# Patient Record
Sex: FEMALE | Race: BLACK OR AFRICAN AMERICAN | NOT HISPANIC OR LATINO | Employment: FULL TIME | URBAN - METROPOLITAN AREA
[De-identification: names, ages, dates, MRNs, and addresses within clinical notes are randomized per-mention and may not be internally consistent; named-entity substitution may affect disease eponyms.]

---

## 2019-01-14 ENCOUNTER — TELEPHONE (OUTPATIENT)
Dept: OBSTETRICS AND GYNECOLOGY | Facility: CLINIC | Age: 54
End: 2019-01-14

## 2019-01-14 NOTE — TELEPHONE ENCOUNTER
----- Message from Marichuy Robbins sent at 1/14/2019 12:55 PM CST -----  Pt at [phone]//states she would like to talk with your office//she thinks she saw Dr Suggs in 2017//does not remember exactly where she saw her in [place]//maybe before  come to [facility]//please call to discuss//thanks/[facility]

## 2019-01-14 NOTE — TELEPHONE ENCOUNTER
Pt has not been seen here but a former Pt of Dr Suggs, she states she has questions. Left message for Pt to call back. DS

## 2019-02-06 ENCOUNTER — OFFICE VISIT (OUTPATIENT)
Dept: OBSTETRICS AND GYNECOLOGY | Facility: CLINIC | Age: 54
End: 2019-02-06
Payer: COMMERCIAL

## 2019-02-06 VITALS
DIASTOLIC BLOOD PRESSURE: 84 MMHG | HEIGHT: 66 IN | SYSTOLIC BLOOD PRESSURE: 126 MMHG | WEIGHT: 205 LBS | BODY MASS INDEX: 32.95 KG/M2

## 2019-02-06 DIAGNOSIS — Z01.419 ENCOUNTER FOR GYNECOLOGICAL EXAMINATION (GENERAL) (ROUTINE) WITHOUT ABNORMAL FINDINGS: Primary | ICD-10-CM

## 2019-02-06 DIAGNOSIS — Z12.4 SCREENING FOR CERVICAL CANCER: ICD-10-CM

## 2019-02-06 DIAGNOSIS — Z12.31 SCREENING MAMMOGRAM, ENCOUNTER FOR: ICD-10-CM

## 2019-02-06 PROCEDURE — 99999 PR PBB SHADOW E&M-EST. PATIENT-LVL III: CPT | Mod: PBBFAC,,, | Performed by: OBSTETRICS & GYNECOLOGY

## 2019-02-06 PROCEDURE — 88141 CYTOPATH C/V INTERPRET: CPT | Mod: ,,, | Performed by: PATHOLOGY

## 2019-02-06 PROCEDURE — 88141 LIQUID-BASED PAP SMEAR, SCREENING: ICD-10-PCS | Mod: ,,, | Performed by: PATHOLOGY

## 2019-02-06 PROCEDURE — 87624 HPV HI-RISK TYP POOLED RSLT: CPT

## 2019-02-06 PROCEDURE — 99386 PREV VISIT NEW AGE 40-64: CPT | Mod: S$GLB,,, | Performed by: OBSTETRICS & GYNECOLOGY

## 2019-02-06 PROCEDURE — 99386 PR PREVENTIVE VISIT,NEW,40-64: ICD-10-PCS | Mod: S$GLB,,, | Performed by: OBSTETRICS & GYNECOLOGY

## 2019-02-06 PROCEDURE — 99999 PR PBB SHADOW E&M-EST. PATIENT-LVL III: ICD-10-PCS | Mod: PBBFAC,,, | Performed by: OBSTETRICS & GYNECOLOGY

## 2019-02-06 PROCEDURE — 88175 CYTOPATH C/V AUTO FLUID REDO: CPT | Performed by: PATHOLOGY

## 2019-02-06 RX ORDER — MULTIVITAMIN
1 TABLET ORAL DAILY
COMMUNITY

## 2019-02-06 RX ORDER — LACTULOSE 10 G/15ML
SOLUTION ORAL; RECTAL
Refills: 0 | COMMUNITY
Start: 2018-11-13 | End: 2019-02-06

## 2019-02-06 NOTE — PROGRESS NOTES
Subjective:       Patient ID: Alessia Mak is a 53 y.o. female.    Chief Complaint:  Well Woman      History of Present Illness  HPI  Annual Exam-Postmenopausal  Patient presents for annual exam. The patient has no complaints today. The patient is not currently sexually active--denies vaginal dryness. GYN screening history: last pap: was normal and patient does not recall when last pap was and last mammogram: approximate date  and was normal. The patient has never been taking hormone replacement therapy. Patient denies post-menopausal vaginal bleeding. The patient wears seatbelts: yes. The patient participates in regular exercise: yes.--walking 3 x/wk; 2miles/40 min;  Has the patient ever been transfused or tattooed?: no. The patient reports that there is not domestic violence in her life.      worsening hot flushes despite black cohosh--feels symptoms are getting better; mood ok;     Denies urinary leakage;         GYN & OB History  Patient's last menstrual period was 2016 (approximate).   Date of Last Pap: No result found    OB History    Para Term  AB Living   1 1 1         SAB TAB Ectopic Multiple Live Births                  # Outcome Date GA Lbr Jesus/2nd Weight Sex Delivery Anes PTL Lv   1 Term      CS-LTranv         Complications: Stillbirth        Has adopted daughter--8 yrs old  Review of Systems  Review of Systems   Endocrine: Positive for hot flashes.           Objective:      Physical Exam:   Constitutional: She appears well-developed.     Eyes: Conjunctivae and EOM are normal. Pupils are equal, round, and reactive to light.    Neck: Normal range of motion. Neck supple.     Pulmonary/Chest: Effort normal. Right breast exhibits no mass, no nipple discharge, no skin change and no tenderness. Left breast exhibits no mass, no nipple discharge, no skin change and no tenderness. Breasts are symmetrical.        Abdominal: Soft.     Genitourinary: Rectum normal, vagina normal and uterus  normal. Pelvic exam was performed with patient supine. Cervix is normal. Right adnexum displays no tenderness. Left adnexum displays no mass and no tenderness. No erythema, bleeding, rectocele, cystocele or unspecified prolapse of vaginal walls in the vagina. No vaginal discharge found. Labial bartholins normal.Additional cervical findings: pap smear done  Genitourinary Comments: atrophic        Uterus Size: 6 cm   Musculoskeletal: Normal range of motion.       Neurological: She is alert.    Skin: Skin is warm.    Psychiatric: She has a normal mood and affect.           Assessment:        1. Encounter for gynecological examination (general) (routine) without abnormal findings    2. Screening mammogram, encounter for    3. Screening for cervical cancer               Plan:      Continue annual well woman exam.  mammo ordered, continue yearly until age 75  Pap today; Reviewed updated recommendations for pap smears (every 3 years) in low risk patients.   Recommend annual pelvic exams.  Reviewed recommendations for annual CBE.  Continue diet, exercise, weight loss  Osteoporosis prevention; 1200mg calcium/d with source of vitamin d

## 2019-02-11 LAB
HPV HR 12 DNA CVX QL NAA+PROBE: NEGATIVE
HPV16 AG SPEC QL: NEGATIVE
HPV18 DNA SPEC QL NAA+PROBE: NEGATIVE

## 2019-02-12 ENCOUNTER — TELEPHONE (OUTPATIENT)
Dept: OBSTETRICS AND GYNECOLOGY | Facility: CLINIC | Age: 54
End: 2019-02-12

## 2019-02-12 NOTE — TELEPHONE ENCOUNTER
Spoke to the pt. And advised her that her pap is mildly abnormal( ascus); but did not show the high risk hpv virus; I do not have to do anything to your cervix at this point, please make sure she  Keeps her yearly exam in order to repeat your pap. Pt. Acknowledged understanding. nayana Bernabe    Please advise the  pap was mildly abnormal( ascus); but did not show the high risk hpv virus; I do not have to do anything to your cervix at this point, please make sure she  Keeps her yearly exam in order to repeat your pap

## 2019-02-15 ENCOUNTER — HOSPITAL ENCOUNTER (OUTPATIENT)
Dept: RADIOLOGY | Facility: HOSPITAL | Age: 54
Discharge: HOME | End: 2019-02-15
Attending: OBSTETRICS & GYNECOLOGY
Payer: COMMERCIAL

## 2019-02-15 VITALS — HEIGHT: 66 IN | WEIGHT: 205 LBS | BODY MASS INDEX: 32.95 KG/M2

## 2019-02-15 DIAGNOSIS — Z12.31 SCREENING MAMMOGRAM, ENCOUNTER FOR: ICD-10-CM

## 2019-02-15 PROCEDURE — 77063 MAMMO DIGITAL SCREENING BILAT WITH TOMOSYNTHESIS_CAD: ICD-10-PCS | Mod: 26,,, | Performed by: RADIOLOGY

## 2019-02-15 PROCEDURE — 77067 SCR MAMMO BI INCL CAD: CPT | Mod: 26,,, | Performed by: RADIOLOGY

## 2019-02-15 PROCEDURE — 77063 BREAST TOMOSYNTHESIS BI: CPT | Mod: 26,,, | Performed by: RADIOLOGY

## 2019-02-15 PROCEDURE — 77067 MAMMO DIGITAL SCREENING BILAT WITH TOMOSYNTHESIS_CAD: ICD-10-PCS | Mod: 26,,, | Performed by: RADIOLOGY

## 2019-02-15 PROCEDURE — 77067 SCR MAMMO BI INCL CAD: CPT | Mod: TC

## 2019-02-18 ENCOUNTER — TELEPHONE (OUTPATIENT)
Dept: OBSTETRICS AND GYNECOLOGY | Facility: CLINIC | Age: 54
End: 2019-02-18

## 2020-12-18 ENCOUNTER — TELEPHONE (OUTPATIENT)
Dept: OBSTETRICS AND GYNECOLOGY | Facility: CLINIC | Age: 55
End: 2020-12-18

## 2020-12-18 NOTE — TELEPHONE ENCOUNTER
----- Message from Sue Lake sent at 12/18/2020  9:55 AM CST -----  Regarding: annual  Type:  Sooner Apoointment Request    Caller is requesting a sooner appointment.  Caller declined first available appointment listed below.  Caller will not accept being placed on the waitlist and is requesting a message be sent to doctor.  Name of Caller:Pt  When is the first available appointment?n/a  Symptoms:annual  Would the patient rather a call back or a response via [facility]? Call back  Best Call Back Number:[phone]  Additional Information: Please call back. Thanks

## 2020-12-24 ENCOUNTER — OFFICE VISIT (OUTPATIENT)
Dept: OBSTETRICS AND GYNECOLOGY | Facility: CLINIC | Age: 55
End: 2020-12-24
Payer: COMMERCIAL

## 2020-12-24 VITALS
SYSTOLIC BLOOD PRESSURE: 126 MMHG | HEIGHT: 66 IN | WEIGHT: 214.75 LBS | DIASTOLIC BLOOD PRESSURE: 70 MMHG | BODY MASS INDEX: 34.51 KG/M2

## 2020-12-24 DIAGNOSIS — Z12.31 SCREENING MAMMOGRAM, ENCOUNTER FOR: ICD-10-CM

## 2020-12-24 DIAGNOSIS — R87.610 ASCUS OF CERVIX WITH NEGATIVE HIGH RISK HPV: ICD-10-CM

## 2020-12-24 DIAGNOSIS — Z01.419 ENCOUNTER FOR GYNECOLOGICAL EXAMINATION (GENERAL) (ROUTINE) WITHOUT ABNORMAL FINDINGS: Primary | ICD-10-CM

## 2020-12-24 PROCEDURE — 99999 PR PBB SHADOW E&M-EST. PATIENT-LVL III: ICD-10-PCS | Mod: PBBFAC,,, | Performed by: OBSTETRICS & GYNECOLOGY

## 2020-12-24 PROCEDURE — 88142 CYTOPATH C/V THIN LAYER: CPT

## 2020-12-24 PROCEDURE — 99999 PR PBB SHADOW E&M-EST. PATIENT-LVL III: CPT | Mod: PBBFAC,,, | Performed by: OBSTETRICS & GYNECOLOGY

## 2020-12-24 PROCEDURE — 99396 PR PREVENTIVE VISIT,EST,40-64: ICD-10-PCS | Mod: S$GLB,,, | Performed by: OBSTETRICS & GYNECOLOGY

## 2020-12-24 PROCEDURE — 87624 HPV HI-RISK TYP POOLED RSLT: CPT

## 2020-12-24 PROCEDURE — 3008F BODY MASS INDEX DOCD: CPT | Mod: CPTII,S$GLB,, | Performed by: OBSTETRICS & GYNECOLOGY

## 2020-12-24 PROCEDURE — 1126F PR PAIN SEVERITY QUANTIFIED, NO PAIN PRESENT: ICD-10-PCS | Mod: S$GLB,,, | Performed by: OBSTETRICS & GYNECOLOGY

## 2020-12-24 PROCEDURE — 99396 PREV VISIT EST AGE 40-64: CPT | Mod: S$GLB,,, | Performed by: OBSTETRICS & GYNECOLOGY

## 2020-12-24 PROCEDURE — 3008F PR BODY MASS INDEX (BMI) DOCUMENTED: ICD-10-PCS | Mod: CPTII,S$GLB,, | Performed by: OBSTETRICS & GYNECOLOGY

## 2020-12-24 PROCEDURE — 1126F AMNT PAIN NOTED NONE PRSNT: CPT | Mod: S$GLB,,, | Performed by: OBSTETRICS & GYNECOLOGY

## 2020-12-24 NOTE — PROGRESS NOTES
Subjective:       Patient ID: Alessia Mak is a 55 y.o. female.    Chief Complaint:  Annual Exam      History of Present Illness  HPI  Annual Exam-Postmenopausal  Patient presents for annual exam. The patient has no complaints today. The patient is not currently sexually active. GYN screening history: last pap: approximate date ascus and was abnormal: ascus; neg hpv;  and last mammogram: approximate date 2019  and was normal. The patient has never been taking hormone replacement therapy. Patient denies post-menopausal vaginal bleeding. The patient wears seatbelts: yes. The patient participates in regular exercise: yes. --walks 2 miles 5x/wk; Has the patient ever been transfused or tattooed?: no. The patient reports that there is not domestic violence in her life.      No problems sleeping  Black cohosh helps with hot flushes    Denies urinary leakage;      Adopted daughter is now 10  GYN & OB History  Patient's last menstrual period was 2016 (approximate).   Date of Last Pap: No result found    OB History    Para Term  AB Living   1 1 1         SAB TAB Ectopic Multiple Live Births                  # Outcome Date GA Lbr Jesus/2nd Weight Sex Delivery Anes PTL Lv   1 Term      CS-LTranv   FD      Complications: Stillbirth       Review of Systems  Review of Systems   Genitourinary: Positive for hot flashes.   All other systems reviewed and are negative.          Objective:      Physical Exam:   Constitutional: She is oriented to person, place, and time. She appears well-developed.     Eyes: Pupils are equal, round, and reactive to light. Conjunctivae and EOM are normal.    Neck: Normal range of motion. Neck supple.     Pulmonary/Chest: Effort normal. Right breast exhibits no mass, no nipple discharge, no skin change and no tenderness. Left breast exhibits no mass, no nipple discharge, no skin change and no tenderness. Breasts are symmetrical.        Abdominal: Soft.     Genitourinary:    Vagina,  uterus, right adnexa, left adnexa and rectum normal.      Pelvic exam was performed with patient supine.   Cervix is normal. There is a normal right adnexa and a normal left adnexa. Right adnexum displays no mass and no tenderness. Left adnexum displays no mass and no tenderness. No erythema, bleeding, rectocele, cystocele or unspecified prolapse of vaginal walls in the vagina. Labial bartholins normal.Additional cervical findings: pap smear donenegative for vaginal discharge       Uterus Size: 6 cm   Musculoskeletal: Normal range of motion.       Neurological: She is alert and oriented to person, place, and time.    Skin: Skin is warm.    Psychiatric: She has a normal mood and affect.           Assessment:         Encounter Diagnoses   Name Primary?    Screening mammogram, encounter for     ASCUS of cervix with negative high risk HPV     Encounter for gynecological examination (general) (routine) without abnormal findings Yes                Plan:      Continue annual well woman exam.  Pap today; Reviewed updated recommendations for pap smears (every 3 years) in low risk patients.   Recommend annual pelvic exams.  Reviewed recommendations for annual CBE.  mammo ordered, continue yearly until age 75  Continue diet, exercise, weight loss  Osteoporosis prevention; 1200mg calcium/d with source of vitamin d   colonoscpy due in 2-3 yrs

## 2021-01-04 LAB
HPV HR 12 DNA SPEC QL NAA+PROBE: NEGATIVE
HPV16 AG SPEC QL: NEGATIVE
HPV18 DNA SPEC QL NAA+PROBE: NEGATIVE

## 2021-01-30 LAB
FINAL PATHOLOGIC DIAGNOSIS: NORMAL
Lab: NORMAL

## 2021-04-29 ENCOUNTER — PATIENT MESSAGE (OUTPATIENT)
Dept: RESEARCH | Facility: HOSPITAL | Age: 56
End: 2021-04-29

## 2022-03-15 ENCOUNTER — OFFICE VISIT (OUTPATIENT)
Dept: OPHTHALMOLOGY | Facility: CLINIC | Age: 57
End: 2022-03-15
Payer: OTHER GOVERNMENT

## 2022-03-15 ENCOUNTER — OFFICE VISIT (OUTPATIENT)
Dept: OTOLARYNGOLOGY | Facility: CLINIC | Age: 57
End: 2022-03-15
Payer: OTHER GOVERNMENT

## 2022-03-15 ENCOUNTER — CLINICAL SUPPORT (OUTPATIENT)
Dept: AUDIOLOGY | Facility: CLINIC | Age: 57
End: 2022-03-15
Payer: OTHER GOVERNMENT

## 2022-03-15 VITALS
WEIGHT: 222.44 LBS | SYSTOLIC BLOOD PRESSURE: 122 MMHG | DIASTOLIC BLOOD PRESSURE: 78 MMHG | HEART RATE: 74 BPM | TEMPERATURE: 98 F | BODY MASS INDEX: 35.9 KG/M2

## 2022-03-15 DIAGNOSIS — H93.13 TINNITUS OF BOTH EARS: Primary | ICD-10-CM

## 2022-03-15 DIAGNOSIS — H93.13 TINNITUS, BILATERAL: Primary | ICD-10-CM

## 2022-03-15 DIAGNOSIS — R51.9 CHRONIC NONINTRACTABLE HEADACHE, UNSPECIFIED HEADACHE TYPE: Primary | ICD-10-CM

## 2022-03-15 DIAGNOSIS — H83.3X9: ICD-10-CM

## 2022-03-15 DIAGNOSIS — H25.11 NUCLEAR SCLEROSIS OF RIGHT EYE: ICD-10-CM

## 2022-03-15 DIAGNOSIS — H25.12 NUCLEAR SCLEROSIS OF LEFT EYE: ICD-10-CM

## 2022-03-15 DIAGNOSIS — G89.29 CHRONIC NONINTRACTABLE HEADACHE, UNSPECIFIED HEADACHE TYPE: Primary | ICD-10-CM

## 2022-03-15 DIAGNOSIS — H52.7 REFRACTIVE DISORDER: ICD-10-CM

## 2022-03-15 PROCEDURE — 92567 TYMPANOMETRY: CPT | Mod: PBBFAC | Performed by: AUDIOLOGIST-HEARING AID FITTER

## 2022-03-15 PROCEDURE — 99999 PR PBB SHADOW E&M-EST. PATIENT-LVL III: CPT | Mod: PBBFAC,,, | Performed by: PHYSICIAN ASSISTANT

## 2022-03-15 PROCEDURE — 99213 OFFICE O/P EST LOW 20 MIN: CPT | Mod: PBBFAC,27 | Performed by: PHYSICIAN ASSISTANT

## 2022-03-15 PROCEDURE — 99999 PR PBB SHADOW E&M-EST. PATIENT-LVL III: ICD-10-PCS | Mod: PBBFAC,,, | Performed by: PHYSICIAN ASSISTANT

## 2022-03-15 PROCEDURE — 99203 OFFICE O/P NEW LOW 30 MIN: CPT | Mod: S$PBB,,, | Performed by: STUDENT IN AN ORGANIZED HEALTH CARE EDUCATION/TRAINING PROGRAM

## 2022-03-15 PROCEDURE — 99203 PR OFFICE/OUTPT VISIT, NEW, LEVL III, 30-44 MIN: ICD-10-PCS | Mod: S$PBB,,, | Performed by: STUDENT IN AN ORGANIZED HEALTH CARE EDUCATION/TRAINING PROGRAM

## 2022-03-15 PROCEDURE — 99999 PR PBB SHADOW E&M-EST. PATIENT-LVL II: CPT | Mod: PBBFAC,,, | Performed by: STUDENT IN AN ORGANIZED HEALTH CARE EDUCATION/TRAINING PROGRAM

## 2022-03-15 PROCEDURE — 99203 OFFICE O/P NEW LOW 30 MIN: CPT | Mod: S$PBB,,, | Performed by: PHYSICIAN ASSISTANT

## 2022-03-15 PROCEDURE — 92552 PURE TONE AUDIOMETRY AIR: CPT | Mod: PBBFAC | Performed by: AUDIOLOGIST-HEARING AID FITTER

## 2022-03-15 PROCEDURE — 99212 OFFICE O/P EST SF 10 MIN: CPT | Mod: PBBFAC | Performed by: STUDENT IN AN ORGANIZED HEALTH CARE EDUCATION/TRAINING PROGRAM

## 2022-03-15 PROCEDURE — 99999 PR PBB SHADOW E&M-EST. PATIENT-LVL I: ICD-10-PCS | Mod: PBBFAC,,, | Performed by: AUDIOLOGIST-HEARING AID FITTER

## 2022-03-15 PROCEDURE — 99999 PR PBB SHADOW E&M-EST. PATIENT-LVL II: ICD-10-PCS | Mod: PBBFAC,,, | Performed by: STUDENT IN AN ORGANIZED HEALTH CARE EDUCATION/TRAINING PROGRAM

## 2022-03-15 PROCEDURE — 99999 PR PBB SHADOW E&M-EST. PATIENT-LVL I: CPT | Mod: PBBFAC,,, | Performed by: AUDIOLOGIST-HEARING AID FITTER

## 2022-03-15 PROCEDURE — 99211 OFF/OP EST MAY X REQ PHY/QHP: CPT | Mod: PBBFAC,27 | Performed by: AUDIOLOGIST-HEARING AID FITTER

## 2022-03-15 PROCEDURE — 99203 PR OFFICE/OUTPT VISIT, NEW, LEVL III, 30-44 MIN: ICD-10-PCS | Mod: S$PBB,,, | Performed by: PHYSICIAN ASSISTANT

## 2022-03-15 RX ORDER — ATORVASTATIN CALCIUM 40 MG/1
40 TABLET, FILM COATED ORAL DAILY
COMMUNITY
Start: 2021-11-20

## 2022-03-15 RX ORDER — ESCITALOPRAM OXALATE 5 MG/1
5 TABLET ORAL DAILY
COMMUNITY
Start: 2022-03-11

## 2022-03-15 RX ORDER — METFORMIN HYDROCHLORIDE 500 MG/1
1000 TABLET, EXTENDED RELEASE ORAL 2 TIMES DAILY
COMMUNITY
Start: 2022-03-11

## 2022-03-15 NOTE — PROGRESS NOTES
HPI     Concerns About Ocular Health      Additional comments: The patient states she has been having headaches   chronically for years. The patient saw her PCP and they wanted her to see   an eye dr first.          Last edited by Marisol Albarran on 3/15/2022  1:10 PM. (History)            Assessment /Plan     For exam results, see Encounter Report.    Chronic nonintractable headache, unspecified headache type- Followed by PCP  Ocular exam WNL    Nuclear sclerosis of right eye- - NVS, monitor    Nuclear sclerosis of left eye- see above    Dry eyes- - ATs QID and lid hygiene w/ baby shampoo    Refractive disorder- MRx dispensed, optional      Return to clinic in  1M For GOCT, GONIO, and IOP check for headaches

## 2022-03-15 NOTE — PROGRESS NOTES
Alessia Mak was seen 03/15/2022 for an audiological evaluation.  She reports tinnitus that first started many years ago.  She describes the tinnitus as ringing and is nonpulsatile.  The tinnitus is present in both ears. She was in the [employer] for 4 years and has a significant hx of noise exposure.  She has not noted any diminished hearing.  [facility] hearing that is WNL. She has not recently started any new medications and has not had any dietary changes. Not yet service connected for tinnitus.     Results reveal normal hearing in each ear from 250-8000 Hz.   Tympanograms were Type A, normal for the right ear and Type A, normal for the left ear.    We discussed that tinnitus is most often caused by a hearing loss, and that as the hair cells are damaged, either genetic or as a result of loud noise exposure, they then cause tinnitus. Some patients find that restricting the salt or caffeine in their diet helps, and there is also an OTC supplement, lipflavinoids, that some people find to be effective though their benefit is not fully proven. Tinnitus tends to be louder in times of stress and fatigue, and may decrease with time. Sound machines may also be an effective masking technique if needed at night.     Patient was counseled on the above findings.    Recommendations:  1. Tinnitus: See above.  2. Annual Audiograms.

## 2022-03-15 NOTE — PROGRESS NOTES
Subjective:   Patient ID: Alessia Mak is a 56 y.o. female.    Chief Complaint: Tinnitus (Ringing in the ears, disrupting sleep)    Patient is a very pleasant 56 y.o. female here to see me today for the first time for evaluation of tinnitus.  She reports tinnitus that first started many years ago.  She describes the tinnitus as ringing and is nonpulsatile.  The tinnitus is present in both ears.  She has not noted any diminished hearing.  She has not recently started any new medications and has not had any dietary changes.    Review of patient's allergies indicates:  No Known Allergies        Review of Systems   Constitutional: Negative for chills, fatigue, fever and unexpected weight change.   HENT: Positive for tinnitus. Negative for congestion, dental problem, ear discharge, ear pain, facial swelling, hearing loss, nosebleeds, postnasal drip, rhinorrhea, sinus pressure, sneezing, sore throat, trouble swallowing and voice change.    Eyes: Negative for redness, itching and visual disturbance.   Respiratory: Negative for cough, choking, shortness of breath and wheezing.    Cardiovascular: Negative for chest pain and palpitations.   Gastrointestinal: Negative for abdominal pain.        No reflux.   Musculoskeletal: Negative for gait problem.   Skin: Negative for rash.   Neurological: Negative for dizziness, light-headedness and headaches.         Objective:   /78   Pulse 74   Temp 97.5 °F (36.4 °C) (Temporal)   Wt 100.9 kg (222 lb 7.1 oz)   LMP 02/06/2016 (Approximate)   BMI 35.90 kg/m²     Physical Exam  Constitutional:       General: She is not in acute distress.     Appearance: She is well-developed.   HENT:      Head: Normocephalic and atraumatic.      Right Ear: Tympanic membrane, ear canal and external ear normal.      Left Ear: Tympanic membrane, ear canal and external ear normal.      Nose: Nose normal. No nasal deformity, septal deviation, mucosal edema or rhinorrhea.      Right Sinus: No  maxillary sinus tenderness or frontal sinus tenderness.      Left Sinus: No maxillary sinus tenderness or frontal sinus tenderness.      Mouth/Throat:      Mouth: Mucous membranes are not pale and not dry.      Dentition: No dental caries.      Pharynx: Uvula midline. No oropharyngeal exudate or posterior oropharyngeal erythema.   Eyes:      General: Lids are normal. No scleral icterus.     Extraocular Movements:      Right eye: Normal extraocular motion and no nystagmus.      Left eye: Normal extraocular motion and no nystagmus.      Conjunctiva/sclera: Conjunctivae normal.      Right eye: Right conjunctiva is not injected. No chemosis.     Left eye: Left conjunctiva is not injected. No chemosis.     Pupils: Pupils are equal, round, and reactive to light.   Neck:      Thyroid: No thyroid mass or thyromegaly.      Trachea: Trachea and phonation normal. No tracheal tenderness or tracheal deviation.   Pulmonary:      Effort: Pulmonary effort is normal. No respiratory distress.      Breath sounds: No stridor.   Abdominal:      General: There is no distension.   Lymphadenopathy:      Head:      Right side of head: No submental, submandibular, preauricular, posterior auricular or occipital adenopathy.      Left side of head: No submental, submandibular, preauricular, posterior auricular or occipital adenopathy.      Cervical: No cervical adenopathy.   Skin:     General: Skin is warm and dry.      Findings: No erythema or rash.   Neurological:      Mental Status: She is alert and oriented to person, place, and time.      Cranial Nerves: No cranial nerve deficit.   Psychiatric:         Behavior: Behavior normal.          Assessment:     1. Tinnitus of both ears        Plan:     Tinnitus of both ears    Audiogram scheduled     We also discussed that tinnitus is most often caused by a hearing loss, and that as the hair cells are damaged, either genetic or as a result of loud noise exposure, they then cause tinnitus.  Some  patients find that restricting the salt or caffeine in their diet helps, and there is also an OTC supplement, lipflavinoids, that some people find to be effective though their benefit is not fully proven.  Tinnitus tends to be louder in times of stress and fatigue, and may decrease with time.  Sound machines may also be an effective masking technique if needed at night.

## 2022-03-24 ENCOUNTER — HOSPITAL ENCOUNTER (OUTPATIENT)
Dept: RADIOLOGY | Facility: HOSPITAL | Age: 57
Discharge: HOME | End: 2022-03-24
Attending: INTERNAL MEDICINE
Payer: OTHER GOVERNMENT

## 2022-03-24 DIAGNOSIS — Z12.31 VISIT FOR SCREENING MAMMOGRAM: ICD-10-CM

## 2022-03-24 DIAGNOSIS — Z01.89 ENCOUNTER FOR OTHER SPECIFIED SPECIAL EXAMINATIONS: ICD-10-CM

## 2022-03-24 PROCEDURE — 70551 MRI BRAIN WITHOUT CONTRAST: ICD-10-PCS | Mod: 26,,, | Performed by: RADIOLOGY

## 2022-03-24 PROCEDURE — 77063 BREAST TOMOSYNTHESIS BI: CPT | Mod: TC

## 2022-03-24 PROCEDURE — 77067 MAMMO DIGITAL SCREENING BILAT WITH TOMO: ICD-10-PCS | Mod: 26,,, | Performed by: RADIOLOGY

## 2022-03-24 PROCEDURE — 77063 MAMMO DIGITAL SCREENING BILAT WITH TOMO: ICD-10-PCS | Mod: 26,,, | Performed by: RADIOLOGY

## 2022-03-24 PROCEDURE — 77063 BREAST TOMOSYNTHESIS BI: CPT | Mod: 26,,, | Performed by: RADIOLOGY

## 2022-03-24 PROCEDURE — 77067 SCR MAMMO BI INCL CAD: CPT | Mod: TC

## 2022-03-24 PROCEDURE — 70551 MRI BRAIN STEM W/O DYE: CPT | Mod: 26,,, | Performed by: RADIOLOGY

## 2022-03-24 PROCEDURE — 70551 MRI BRAIN STEM W/O DYE: CPT | Mod: TC

## 2022-03-24 PROCEDURE — 77067 SCR MAMMO BI INCL CAD: CPT | Mod: 26,,, | Performed by: RADIOLOGY

## 2022-04-19 ENCOUNTER — OFFICE VISIT (OUTPATIENT)
Dept: OPHTHALMOLOGY | Facility: CLINIC | Age: 57
End: 2022-04-19
Payer: OTHER GOVERNMENT

## 2022-04-19 DIAGNOSIS — G89.29 CHRONIC NONINTRACTABLE HEADACHE, UNSPECIFIED HEADACHE TYPE: Primary | ICD-10-CM

## 2022-04-19 DIAGNOSIS — H04.123 DRY EYES, BILATERAL: ICD-10-CM

## 2022-04-19 DIAGNOSIS — R51.9 CHRONIC NONINTRACTABLE HEADACHE, UNSPECIFIED HEADACHE TYPE: Primary | ICD-10-CM

## 2022-04-19 PROCEDURE — 99213 OFFICE O/P EST LOW 20 MIN: CPT | Mod: S$PBB,,, | Performed by: STUDENT IN AN ORGANIZED HEALTH CARE EDUCATION/TRAINING PROGRAM

## 2022-04-19 PROCEDURE — 99999 PR PBB SHADOW E&M-EST. PATIENT-LVL II: ICD-10-PCS | Mod: PBBFAC,,, | Performed by: STUDENT IN AN ORGANIZED HEALTH CARE EDUCATION/TRAINING PROGRAM

## 2022-04-19 PROCEDURE — 99999 PR PBB SHADOW E&M-EST. PATIENT-LVL II: CPT | Mod: PBBFAC,,, | Performed by: STUDENT IN AN ORGANIZED HEALTH CARE EDUCATION/TRAINING PROGRAM

## 2022-04-19 PROCEDURE — 92020 PR SPECIAL EYE EVAL,GONIOSCOPY: ICD-10-PCS | Mod: S$PBB,,, | Performed by: STUDENT IN AN ORGANIZED HEALTH CARE EDUCATION/TRAINING PROGRAM

## 2022-04-19 PROCEDURE — 92020 GONIOSCOPY: CPT | Mod: PBBFAC | Performed by: STUDENT IN AN ORGANIZED HEALTH CARE EDUCATION/TRAINING PROGRAM

## 2022-04-19 PROCEDURE — 99213 PR OFFICE/OUTPT VISIT, EST, LEVL III, 20-29 MIN: ICD-10-PCS | Mod: S$PBB,,, | Performed by: STUDENT IN AN ORGANIZED HEALTH CARE EDUCATION/TRAINING PROGRAM

## 2022-04-19 PROCEDURE — 92020 GONIOSCOPY: CPT | Mod: S$PBB,,, | Performed by: STUDENT IN AN ORGANIZED HEALTH CARE EDUCATION/TRAINING PROGRAM

## 2022-04-19 PROCEDURE — 92133 CPTRZD OPH DX IMG PST SGM ON: CPT | Mod: PBBFAC | Performed by: STUDENT IN AN ORGANIZED HEALTH CARE EDUCATION/TRAINING PROGRAM

## 2022-04-19 PROCEDURE — 92133 POSTERIOR SEGMENT OCT OPTIC NERVE(OCULAR COHERENCE TOMOGRAPHY) - OU - BOTH EYES: ICD-10-PCS | Mod: 26,S$PBB,, | Performed by: STUDENT IN AN ORGANIZED HEALTH CARE EDUCATION/TRAINING PROGRAM

## 2022-04-19 NOTE — PROGRESS NOTES
HPI     Follow-up      Additional comments: Pt here for IOP GOCT gonio chk. Pt states she is   still having headaches.[facility].           Last edited by Alex Fu MA on 4/19/2022  3:56 PM. (History)            Assessment /Plan     For exam results, see Encounter Report.    Chronic nonintractable headache, unspecified headache type  -   Eye exam and all testing WNL, continue care with PCP for headaches    Dry eyes, bilateral- - ATs QID and lid hygiene w/ baby shampoo  - Omega 3 Fish Oils        Return to clinic in 1 year or PRN                  
Emergency airway/Airway protection

## 2022-05-30 ENCOUNTER — TELEPHONE (OUTPATIENT)
Dept: GASTROENTEROLOGY | Facility: CLINIC | Age: 57
End: 2022-05-30
Payer: COMMERCIAL

## 2022-05-30 NOTE — TELEPHONE ENCOUNTER
----- Message from Karen Hunter sent at 5/30/2022 10:31 AM CDT -----  Regarding: Colonoscopy  Contact: Pt  Pt requesting to speak w/ you in regards to Appt Scheduled For  Colonoscopy    Please call and advise    Phone [phone]

## 2022-05-31 ENCOUNTER — OFFICE VISIT (OUTPATIENT)
Dept: GASTROENTEROLOGY | Facility: CLINIC | Age: 57
End: 2022-05-31
Payer: OTHER GOVERNMENT

## 2022-05-31 VITALS
SYSTOLIC BLOOD PRESSURE: 132 MMHG | DIASTOLIC BLOOD PRESSURE: 84 MMHG | HEIGHT: 66 IN | HEART RATE: 81 BPM | WEIGHT: 226 LBS | BODY MASS INDEX: 36.32 KG/M2

## 2022-05-31 DIAGNOSIS — Z80.9 FAMILY HISTORY OF CARCINOID TUMOR: Primary | ICD-10-CM

## 2022-05-31 DIAGNOSIS — Z80.0 FAMILY HISTORY OF LIVER CANCER: ICD-10-CM

## 2022-05-31 DIAGNOSIS — Z12.11 COLON CANCER SCREENING: ICD-10-CM

## 2022-05-31 PROCEDURE — 99999 PR PBB SHADOW E&M-EST. PATIENT-LVL IV: ICD-10-PCS | Mod: PBBFAC,,, | Performed by: NURSE PRACTITIONER

## 2022-05-31 PROCEDURE — 99999 PR PBB SHADOW E&M-EST. PATIENT-LVL IV: CPT | Mod: PBBFAC,,, | Performed by: NURSE PRACTITIONER

## 2022-05-31 PROCEDURE — 99499 UNLISTED E&M SERVICE: CPT | Mod: S$PBB,,, | Performed by: NURSE PRACTITIONER

## 2022-05-31 PROCEDURE — 99499 NO LOS: ICD-10-PCS | Mod: S$PBB,,, | Performed by: NURSE PRACTITIONER

## 2022-05-31 NOTE — PROGRESS NOTES
Clinic Consult:  [facility] Consultation Note    Reason for Consult:  The primary encounter diagnosis was Family history of carcinoid tumor. Diagnoses of Family history of liver cancer and Colon cancer screening were also pertinent to this visit.    PCP: Primary Doctor No   [id number] [facility] [id number]    HPI:  This is a 56 y.o. female here for evaluation of the above.   [facility].     She reports carcinoid cancer of the small intestines and liver in her mother and liver in her maternal grandmother.  She had a colonoscopy 2017 with a 5 year recall.     She is asymptomatic and denies any GI issues. No abdominal pain, hematochezia, melena, nausea, vomiting, or weight loss.      Review of Systems   Constitutional: Negative for fever, malaise/fatigue and weight loss.   HENT: Negative for sore throat.    Respiratory: Negative for cough and wheezing.    Cardiovascular: Negative for chest pain and palpitations.   Gastrointestinal: Negative for abdominal pain, blood in stool, constipation, diarrhea, heartburn, melena, nausea and vomiting.   Genitourinary: Negative for dysuria and frequency.   Musculoskeletal: Negative for back pain, joint pain, myalgias and neck pain.   Skin: Negative for itching and rash.   Neurological: Negative for dizziness, speech change, seizures, loss of consciousness and headaches.   Psychiatric/Behavioral: Negative for depression and substance abuse. The patient is not nervous/anxious.        Medical History:  has a past medical history of Diabetes mellitus and Hyperlipidemia.    Surgical History:  has a past surgical history that includes  section (2010).    Family History: family history includes Diabetes in her father; Liver cancer in her maternal grandmother and mother..     Social History:  reports that she has never smoked. She has never used smokeless tobacco. She reports current alcohol use.  "She reports that she does not use drugs.    Allergies: Reviewed    Home Medications:   Current Outpatient Medications on File Prior to Visit   Medication Sig Dispense Refill    atorvastatin (LIPITOR) 40 MG tablet Take 40 mg by mouth once daily.      BLACK COHOSH ORAL Take 1 capsule by mouth.      EScitalopram oxalate (LEXAPRO) 5 MG Tab Take 5 mg by mouth once daily.      Lactobac no.41/Bifidobact no.7 (PROBIOTIC-10 ORAL) Take 1 capsule by mouth.      metFORMIN (GLUCOPHAGE-XR) 500 MG ER 24hr tablet Take 1,000 mg by mouth 2 (two) times daily.      multivitamin (THERAGRAN) per tablet Take 1 tablet by mouth once daily.       No current facility-administered medications on file prior to visit.       Physical Exam:  /84 (BP Location: Left arm, Patient Position: Sitting, BP Method: Large (Manual))   Pulse 81   Ht 5' 6" (1.676 m)   Wt 102.5 kg (225 lb 15.5 oz)   LMP 02/06/2016 (Approximate)   BMI 36.47 kg/m²   Body mass index is 36.47 kg/m².  Physical Exam  Constitutional:       General: She is not in acute distress.  HENT:      Head: Normocephalic and atraumatic.   Eyes:      General: No scleral icterus.     Conjunctiva/sclera: Conjunctivae normal.   Cardiovascular:      Rate and Rhythm: Normal rate and regular rhythm.      Heart sounds: No murmur heard.  Pulmonary:      Effort: Pulmonary effort is normal. No respiratory distress.      Breath sounds: Normal breath sounds. No wheezing.   Abdominal:      General: Abdomen is flat. Bowel sounds are normal.      Palpations: Abdomen is soft.      Tenderness: There is no abdominal tenderness.   Skin:     General: Skin is warm and dry.   Neurological:      General: No focal deficit present.      Mental Status: She is alert and oriented to person, place, and time.      Cranial Nerves: No cranial nerve deficit.   Psychiatric:         Mood and Affect: Mood normal.         Judgment: Judgment normal.         Labs: Pertinent labs reviewed.  CRC Screening: up to date "     Assessment:  1. Family history of carcinoid tumor    2. Family history of liver cancer    3. Colon cancer screening      [facility] for colon cancer screening.   Her colonoscopy is up to date is not due for repeat until December 2022. She does mention a family history of carcinoid cancer in her mother and maternal grandmother. Carcinoid tumors are most commonly found in the intestines. She reports that her mother had small bowel carcinoid but she and her grandmother also had carcinoid of the liver which is extremely rare.     Recommendations:   - does not need colonoscopy at this time. Will reach out to [facility] in November to extend her referral so she can have colonoscopy in December 2022.   - instructed patient to follow up with her PCP regarding consideration for genetic testing +/- abdominal ultrasound for screening of liver cancer.     Family history of carcinoid tumor    Family history of liver cancer    Colon cancer screening         Follow up if symptoms worsen or fail to improve.    Thank you so much for allowing me to participate in the care of IRENA Hoskins

## 2022-05-31 NOTE — PATIENT INSTRUCTIONS
Verified colonoscopy date. Was last done on 12/1/2017 with a 5 year recall so is currently up to date and will not be due until 12/2022.  She has a family history of carcinoid tumor in small bowel and liver. Consider liver ultrasound for screening. Also recommend referral to genetics for evaluation. Genetic evaluation can be done through [facility] with Dr. Keny Vasquez.

## 2022-11-01 NOTE — PROGRESS NOTES
Contacted patient to inform that her colonoscopy is due. Instructed patient to contact her [facility] provider to obtain a updated referral.

## 2022-11-01 NOTE — PROGRESS NOTES
Patient is now due for screening colonoscopy.   She is a [facility] patient and her previous referral has .   Please notify patient so she can contact her [facility] provider to get new referral sent over so that appt can be made.

## 2022-12-21 ENCOUNTER — TELEPHONE (OUTPATIENT)
Dept: OBSTETRICS AND GYNECOLOGY | Facility: CLINIC | Age: 57
End: 2022-12-21
Payer: COMMERCIAL

## 2022-12-22 ENCOUNTER — TELEPHONE (OUTPATIENT)
Dept: OBSTETRICS AND GYNECOLOGY | Facility: CLINIC | Age: 57
End: 2022-12-22
Payer: COMMERCIAL

## 2022-12-22 NOTE — TELEPHONE ENCOUNTER
----- Message from Katelynn Jimenez sent at 12/22/2022 12:09 PM CST -----  Contact: [phone]  Patient called in requesting Annual for Dr. Jolene Suggs. Please call her back at [phone]. Thanks/QUINTEN

## 2023-01-03 ENCOUNTER — TELEPHONE (OUTPATIENT)
Dept: PREADMISSION TESTING | Facility: HOSPITAL | Age: 58
End: 2023-01-03
Payer: COMMERCIAL

## 2023-01-03 NOTE — TELEPHONE ENCOUNTER
----- Message from Nasreen Lira sent at 12/29/2022 11:12 AM CST -----  Contact: [phone]  Pt is calling regarding colonoscopy . Please call back at [phone]. Thanks/dj

## 2023-10-13 ENCOUNTER — OFFICE VISIT (OUTPATIENT)
Dept: GASTROENTEROLOGY | Facility: CLINIC | Age: 58
End: 2023-10-13
Payer: OTHER GOVERNMENT

## 2023-10-13 VITALS
HEIGHT: 66 IN | HEART RATE: 83 BPM | WEIGHT: 215.38 LBS | BODY MASS INDEX: 34.61 KG/M2 | SYSTOLIC BLOOD PRESSURE: 122 MMHG | DIASTOLIC BLOOD PRESSURE: 76 MMHG

## 2023-10-13 DIAGNOSIS — Z12.11 COLON CANCER SCREENING: Primary | ICD-10-CM

## 2023-10-13 PROCEDURE — 99999 PR PBB SHADOW E&M-EST. PATIENT-LVL V: ICD-10-PCS | Mod: PBBFAC,,, | Performed by: NURSE PRACTITIONER

## 2023-10-13 PROCEDURE — 99999 PR PBB SHADOW E&M-EST. PATIENT-LVL V: CPT | Mod: PBBFAC,,, | Performed by: NURSE PRACTITIONER

## 2023-10-13 PROCEDURE — 99499 NO LOS: ICD-10-PCS | Mod: S$PBB,,, | Performed by: NURSE PRACTITIONER

## 2023-10-13 PROCEDURE — 99215 OFFICE O/P EST HI 40 MIN: CPT | Mod: PBBFAC | Performed by: NURSE PRACTITIONER

## 2023-10-13 PROCEDURE — 99499 UNLISTED E&M SERVICE: CPT | Mod: S$PBB,,, | Performed by: NURSE PRACTITIONER

## 2023-10-13 RX ORDER — POLYETHYLENE GLYCOL 3350, SODIUM SULFATE ANHYDROUS, SODIUM BICARBONATE, SODIUM CHLORIDE, POTASSIUM CHLORIDE 236; 22.74; 6.74; 5.86; 2.97 G/4L; G/4L; G/4L; G/4L; G/4L
4 POWDER, FOR SOLUTION ORAL ONCE
Qty: 4000 ML | Refills: 0 | Status: SHIPPED | OUTPATIENT
Start: 2023-10-13 | End: 2023-10-13

## 2023-10-13 RX ORDER — SEMAGLUTIDE 0.68 MG/ML
INJECTION, SOLUTION SUBCUTANEOUS
COMMUNITY
Start: 2023-08-29

## 2023-10-13 RX ORDER — SERTRALINE HYDROCHLORIDE 100 MG/1
100 TABLET, FILM COATED ORAL EVERY MORNING
COMMUNITY
Start: 2023-09-22

## 2023-10-13 RX ORDER — PRAZOSIN HYDROCHLORIDE 1 MG/1
1 CAPSULE ORAL
COMMUNITY
Start: 2023-09-22 | End: 2024-03-20

## 2023-10-13 NOTE — PROGRESS NOTES
Clinic Follow Up:  [facility] Follow Up Note    Reason for Follow Up:  The encounter diagnosis was Colon cancer screening.    PCP: Lala, Primary Doctor   [address] [facility] [address]    HPI:  This is a 58 y.o. female here for follow up.   [facility]   She had a colonoscopy in 2017 and was told to repeat in 5 years. She does not have a history of colon polyps or family history of colon cancer. Reason for 5 year repeat is unknown. She denies any current GI issues at this time.     Review of Systems   Constitutional:  Negative for activity change and appetite change.        As per interval history above   Respiratory:  Negative for cough and shortness of breath.    Cardiovascular:  Negative for chest pain.   Gastrointestinal:  Negative for abdominal pain, anal bleeding, blood in stool, constipation, diarrhea, nausea and vomiting.   Skin:  Negative for color change and rash.       Medical History:  Past Medical History:   Diagnosis Date    Diabetes mellitus     Hyperlipidemia        Surgical History:   Past Surgical History:   Procedure Laterality Date     SECTION  2010       Family History:   Family History   Problem Relation Age of Onset    Liver cancer Mother     Diabetes Father     Liver cancer Maternal Grandmother        Social History:   Social History     Tobacco Use    Smoking status: Never    Smokeless tobacco: Never   Substance Use Topics    Alcohol use: Yes    Drug use: No       Allergies: Review of patient's allergies indicates:  No Known Allergies    Home Medications:  Current Outpatient Medications on File Prior to Visit   Medication Sig Dispense Refill    atorvastatin (LIPITOR) 40 MG tablet Take 40 mg by mouth once daily.      BLACK COHOSH ORAL Take 1 capsule by mouth.      EScitalopram oxalate (LEXAPRO) 5 MG Tab Take 5 mg by mouth once daily.      Lactobac no.41/Bifidobact no.7 (PROBIOTIC-10 ORAL) Take 1  "capsule by mouth.      metFORMIN (GLUCOPHAGE-XR) 500 MG ER 24hr tablet Take 1,000 mg by mouth 2 (two) times daily.      multivitamin (THERAGRAN) per tablet Take 1 tablet by mouth once daily.      OZEMPIC 0.25 mg or 0.5 mg (2 mg/3 mL) pen injector SMARTSI.4 Milliliter(s) SUB-Q Once a Week      prazosin (MINIPRESS) 1 MG Cap Take 1 mg by mouth.      sertraline (ZOLOFT) 100 MG tablet Take 100 mg by mouth every morning.       No current facility-administered medications on file prior to visit.       /76 (BP Location: Right arm, Patient Position: Sitting, BP Method: Large (Manual))   Pulse 83   Ht 5' 6" (1.676 m)   Wt 97.7 kg (215 lb 6.2 oz)   LMP 2016 (Approximate)   BMI 34.76 kg/m²   Body mass index is 34.76 kg/m².  Physical Exam  Constitutional:       General: She is not in acute distress.  HENT:      Head: Normocephalic.   Cardiovascular:      Rate and Rhythm: Normal rate and regular rhythm.      Heart sounds: Normal heart sounds. No murmur heard.  Pulmonary:      Effort: Pulmonary effort is normal. No respiratory distress.      Breath sounds: Normal breath sounds.   Neurological:      General: No focal deficit present.      Mental Status: She is alert.   Psychiatric:         Mood and Affect: Mood normal.         Behavior: Behavior normal.         Judgment: Judgment normal.         Labs: Pertinent labs reviewed.  CRC Screening: due     Assessment:   1. Colon cancer screening      Recommendations:   - colonoscopy   - A referral has been placed for endoscopy procedure scheduling and pre-admission testing (PAT) appointment has been scheduled.      Colon cancer screening  -     polyethylene glycol (GOLYTELY) 236-22.74-6.74 -5.86 gram suspension; Take 4,000 mLs (4 L total) by mouth once. for 1 dose  Dispense: 4000 mL; Refill: 0  -     Case Request Endoscopy: COLONOSCOPY  -     Ambulatory referral/consult to Endo Procedure ; Future; Expected date: 10/14/2023    Return to Clinic:  Follow up to be " determined after results/ procedure(s).     Thank you for the opportunity to participate in the care of this patient.  IRENA Fry

## 2023-10-16 ENCOUNTER — HOSPITAL ENCOUNTER (OUTPATIENT)
Dept: PREADMISSION TESTING | Facility: HOSPITAL | Age: 58
Discharge: HOME | End: 2023-10-16
Attending: INTERNAL MEDICINE
Payer: OTHER GOVERNMENT

## 2023-10-16 DIAGNOSIS — Z12.11 COLON CANCER SCREENING: ICD-10-CM

## 2023-11-10 ENCOUNTER — OFFICE VISIT (OUTPATIENT)
Dept: OBSTETRICS AND GYNECOLOGY | Facility: CLINIC | Age: 58
End: 2023-11-10
Payer: OTHER GOVERNMENT

## 2023-11-10 VITALS
SYSTOLIC BLOOD PRESSURE: 116 MMHG | WEIGHT: 213.94 LBS | DIASTOLIC BLOOD PRESSURE: 86 MMHG | BODY MASS INDEX: 34.38 KG/M2 | HEIGHT: 66 IN

## 2023-11-10 DIAGNOSIS — Z12.31 SCREENING MAMMOGRAM, ENCOUNTER FOR: ICD-10-CM

## 2023-11-10 DIAGNOSIS — N95.1 SYMPTOMATIC MENOPAUSAL OR FEMALE CLIMACTERIC STATES: ICD-10-CM

## 2023-11-10 DIAGNOSIS — Z12.4 SCREENING FOR CERVICAL CANCER: ICD-10-CM

## 2023-11-10 DIAGNOSIS — Z01.419 ENCOUNTER FOR GYNECOLOGICAL EXAMINATION (GENERAL) (ROUTINE) WITHOUT ABNORMAL FINDINGS: Primary | ICD-10-CM

## 2023-11-10 PROCEDURE — 99999 PR PBB SHADOW E&M-EST. PATIENT-LVL III: ICD-10-PCS | Mod: PBBFAC,,, | Performed by: OBSTETRICS & GYNECOLOGY

## 2023-11-10 PROCEDURE — 99396 PREV VISIT EST AGE 40-64: CPT | Mod: S$PBB,,, | Performed by: OBSTETRICS & GYNECOLOGY

## 2023-11-10 PROCEDURE — 99999 PR PBB SHADOW E&M-EST. PATIENT-LVL III: CPT | Mod: PBBFAC,,, | Performed by: OBSTETRICS & GYNECOLOGY

## 2023-11-10 PROCEDURE — 88175 CYTOPATH C/V AUTO FLUID REDO: CPT | Performed by: OBSTETRICS & GYNECOLOGY

## 2023-11-10 PROCEDURE — 87624 HPV HI-RISK TYP POOLED RSLT: CPT | Performed by: OBSTETRICS & GYNECOLOGY

## 2023-11-10 PROCEDURE — 99213 OFFICE O/P EST LOW 20 MIN: CPT | Mod: PBBFAC,PN | Performed by: OBSTETRICS & GYNECOLOGY

## 2023-11-10 PROCEDURE — 99396 PR PREVENTIVE VISIT,EST,40-64: ICD-10-PCS | Mod: S$PBB,,, | Performed by: OBSTETRICS & GYNECOLOGY

## 2023-11-10 NOTE — PROGRESS NOTES
Subjective:       Patient ID: Alessia Mak is a 58 y.o. female.    Chief Complaint:  Well Woman      History of Present Illness  HPI  Annual Exam-Postmenopausal  Patient presents for annual exam. The patient has no complaints today. The patient is not currently sexually active. GYN screening history: last pap: approximate date  and was normal and last mammogram: approximate date 3/2023 and was normal. The patient has never been taking hormone replacement therapy. Patient denies post-menopausal vaginal bleeding. The patient wears seatbelts: yes. The patient participates in regular exercise: yes. -=trying to resume walking; Has the patient ever been transfused or tattooed?: no. The patient reports that there is not domestic violence in her life.  No problems sleeping  Using black cohosh for hot flushes;and sleeping under fan  Denies urinary leakage      GYN & OB History  Patient's last menstrual period was 2016 (approximate).   Date of Last Pap: 2021    OB History    Para Term  AB Living   1 1 1         SAB IAB Ectopic Multiple Live Births                  # Outcome Date GA Lbr Jesus/2nd Weight Sex Delivery Anes PTL Lv   1 Term      CS-LTranv   FD      Complications: Stillbirth       Review of Systems  Review of Systems   Genitourinary:  Positive for hot flashes.   All other systems reviewed and are negative.          Objective:      Physical Exam:   Constitutional: She is oriented to person, place, and time. She appears well-developed and well-nourished.     Eyes: Pupils are equal, round, and reactive to light. Conjunctivae and EOM are normal.      Pulmonary/Chest: Effort normal. Right breast exhibits no mass, no nipple discharge, no skin change and no tenderness. Left breast exhibits no mass, no nipple discharge, no skin change and no tenderness. Breasts are symmetrical.        Abdominal: Soft.     Genitourinary:    Vagina, uterus, right adnexa, left adnexa and rectum normal.       Pelvic exam was performed with patient supine.   The external female genitalia was normal.   Labial bartholins normal.Cervix is normal. Right adnexum displays no mass and no tenderness. Left adnexum displays no mass and no tenderness. No erythema,  no vaginal discharge, bleeding, rectocele, cystocele or unspecified prolapse of vaginal walls in the vagina. Vagina was moist.Vaginal atrophy noted.    pap smear completedUerus contour normal  Normal urethral meatus.Urethra findings: no urethral massBladder findings: no bladder distention and no bladder tenderness          Musculoskeletal: Normal range of motion and moves all extremeties.       Neurological: She is alert and oriented to person, place, and time.    Skin: Skin is warm.    Psychiatric: She has a normal mood and affect. Her behavior is normal.           Assessment:        1. Encounter for gynecological examination (general) (routine) without abnormal findings    2. Screening mammogram, encounter for    3. Screening for cervical cancer    4. Symptomatic menopausal or female climacteric states               Plan:      Continue annual well woman exam.  Pap today; Reviewed updated recommendations for pap smears (every 3 years) in low risk patients.   Recommend annual pelvic exams.  Reviewed recommendations for annual CBE.  mammo ordered, continue yearly until age 75   Colonsocopy to be scheduled  Reviewed options for hot flushes--treat symptoms vs treat problem--reviewed use otc--amberen, estroven, liza thuan, black cohosh, red clover, non estrogen--brisdelle, clonidine , verozah vs estrogen  Reviewed risks/use ERT; prefers to contnue black cohosh  encouraged diet, exercise, weight loss

## 2023-11-13 ENCOUNTER — HOSPITAL ENCOUNTER (OUTPATIENT)
Dept: PREADMISSION TESTING | Facility: HOSPITAL | Age: 58
Discharge: HOME | End: 2023-11-13
Attending: FAMILY MEDICINE
Payer: OTHER GOVERNMENT

## 2023-11-15 ENCOUNTER — HOSPITAL ENCOUNTER (OUTPATIENT)
Dept: PREADMISSION TESTING | Facility: HOSPITAL | Age: 58
Discharge: HOME | End: 2023-11-15
Attending: STUDENT IN AN ORGANIZED HEALTH CARE EDUCATION/TRAINING PROGRAM
Payer: OTHER GOVERNMENT

## 2023-11-21 LAB
FINAL PATHOLOGIC DIAGNOSIS: NORMAL
Lab: NORMAL

## 2023-11-22 NOTE — PROGRESS NOTES
Good News! Your pap smear came back and it was normal.   I still recommend doing a pelvic exam and annual visit every year, but you only need the pap test every 3 years. Please call me if you have any further questions.   Sincerely,   Dr. Suggs

## 2023-12-15 ENCOUNTER — HOSPITAL ENCOUNTER (OUTPATIENT)
Dept: PREADMISSION TESTING | Facility: HOSPITAL | Age: 58
Discharge: HOME | End: 2023-12-15
Attending: STUDENT IN AN ORGANIZED HEALTH CARE EDUCATION/TRAINING PROGRAM
Payer: OTHER GOVERNMENT

## 2024-02-02 ENCOUNTER — HOSPITAL ENCOUNTER (OUTPATIENT)
Facility: HOSPITAL | Age: 59
Discharge: HOME | End: 2024-02-02
Attending: INTERNAL MEDICINE | Admitting: INTERNAL MEDICINE
Payer: OTHER GOVERNMENT

## 2024-02-02 VITALS
TEMPERATURE: 97 F | SYSTOLIC BLOOD PRESSURE: 161 MMHG | WEIGHT: 211.44 LBS | RESPIRATION RATE: 20 BRPM | BODY MASS INDEX: 33.98 KG/M2 | DIASTOLIC BLOOD PRESSURE: 87 MMHG | HEIGHT: 66 IN | OXYGEN SATURATION: 99 % | HEART RATE: 89 BPM

## 2024-02-02 DIAGNOSIS — Z12.11 COLON CANCER SCREENING: Primary | ICD-10-CM

## 2024-02-02 PROCEDURE — 82962 GLUCOSE BLOOD TEST: CPT | Performed by: INTERNAL MEDICINE

## 2024-02-02 RX ORDER — SODIUM CHLORIDE, SODIUM LACTATE, POTASSIUM CHLORIDE, CALCIUM CHLORIDE 600; 310; 30; 20 MG/100ML; MG/100ML; MG/100ML; MG/100ML
INJECTION, SOLUTION INTRAVENOUS CONTINUOUS
Status: DISCONTINUED | OUTPATIENT
Start: 2024-02-02 | End: 2024-02-02 | Stop reason: HOSPADM

## 2024-02-02 NOTE — PLAN OF CARE
Pt only took half of her prep and her stool was brown, MD aware. Procedure not preformed. Pt offered a Monday apt with extended prep but pt wished to be rescheduled at a later date.  Scheduling will reach out to reschedule the procedure.

## 2025-03-14 ENCOUNTER — OFFICE VISIT (OUTPATIENT)
Dept: OBSTETRICS AND GYNECOLOGY | Facility: CLINIC | Age: 60
End: 2025-03-14
Payer: COMMERCIAL

## 2025-03-14 VITALS
BODY MASS INDEX: 36.32 KG/M2 | WEIGHT: 226 LBS | SYSTOLIC BLOOD PRESSURE: 118 MMHG | DIASTOLIC BLOOD PRESSURE: 84 MMHG | HEIGHT: 66 IN

## 2025-03-14 DIAGNOSIS — Z12.31 SCREENING MAMMOGRAM, ENCOUNTER FOR: Primary | ICD-10-CM

## 2025-03-14 DIAGNOSIS — Z12.4 SCREENING FOR CERVICAL CANCER: ICD-10-CM

## 2025-03-14 DIAGNOSIS — N76.4 VULVAR ABSCESS: ICD-10-CM

## 2025-03-14 DIAGNOSIS — Z01.419 ENCOUNTER FOR GYNECOLOGICAL EXAMINATION (GENERAL) (ROUTINE) WITHOUT ABNORMAL FINDINGS: ICD-10-CM

## 2025-03-14 PROCEDURE — 99999 PR PBB SHADOW E&M-EST. PATIENT-LVL III: CPT | Mod: PBBFAC,,, | Performed by: OBSTETRICS & GYNECOLOGY

## 2025-03-14 RX ORDER — MUPIROCIN 20 MG/G
OINTMENT TOPICAL 3 TIMES DAILY
Qty: 30 G | Refills: 0 | Status: SHIPPED | OUTPATIENT
Start: 2025-03-14 | End: 2025-03-24

## 2025-03-14 RX ORDER — SULFAMETHOXAZOLE AND TRIMETHOPRIM 800; 160 MG/1; MG/1
1 TABLET ORAL 2 TIMES DAILY
Qty: 28 TABLET | Refills: 0 | Status: SHIPPED | OUTPATIENT
Start: 2025-03-14 | End: 2025-03-28

## 2025-03-14 RX ORDER — DOXYCYCLINE HYCLATE 100 MG
100 TABLET ORAL 2 TIMES DAILY
COMMUNITY
Start: 2024-12-31

## 2025-03-14 RX ORDER — METFORMIN HYDROCHLORIDE 500 MG/1
500 TABLET, EXTENDED RELEASE ORAL 2 TIMES DAILY WITH MEALS
COMMUNITY
Start: 2025-01-29

## 2025-03-14 RX ORDER — LANCETS
EACH MISCELLANEOUS
COMMUNITY
Start: 2024-12-04

## 2025-03-14 RX ORDER — CLINDAMYCIN PHOSPHATE 10 UG/ML
LOTION TOPICAL 2 TIMES DAILY
COMMUNITY
Start: 2024-12-31

## 2025-03-14 NOTE — PROGRESS NOTES
Subjective:       Patient ID: Alessia Mak is a 59 y.o. female.    Chief Complaint:  Annual Exam      History of Present Illness  HPI  Annual Exam-Postmenopausal  Patient presents for annual exam. The patient reports recent problem with recurrent boils in the vagina--told had new dx of hydradenitis; has been on doxy for several weeks with no improvement. The patient is not currently sexually active. GYN screening history: last pap: approximate date 2023 and was normal and last mammogram: approximate date 2023 and was normal. The patient has never been taking hormone replacement therapy. Patient denies post-menopausal vaginal bleeding. The patient wears seatbelts: yes. The patient participates in regular exercise: yes.--walks 3x/wk;  Has the patient ever been transfused or tattooed?: no. The patient reports that there is not domestic violence in her life.  Denies hot flushes  No problems sleeping  Denies urinary leakage    Colonoscopy due  GYN & OB History  Patient's last menstrual period was 2016 (approximate).   Date of Last Pap: 2023    OB History    Para Term  AB Living   1 1 1      SAB IAB Ectopic Multiple Live Births             # Outcome Date GA Lbr Jesus/2nd Weight Sex Type Anes PTL Lv   1 Term      CS-LTranv   FD      Complications: Stillbirth       Review of Systems  Review of Systems   Genitourinary:  Positive for vaginal pain.   All other systems reviewed and are negative.          Objective:      Physical Exam:   Constitutional: She is oriented to person, place, and time. She appears well-developed and well-nourished.     Eyes: Pupils are equal, round, and reactive to light. Conjunctivae and EOM are normal.      Pulmonary/Chest: Effort normal. Right breast exhibits no mass, no nipple discharge, no skin change and no tenderness. Left breast exhibits no mass, no nipple discharge, no skin change and no tenderness. Breasts are symmetrical.        Abdominal: Soft.      Genitourinary:    Inguinal canal, urethra, bladder, vagina, uterus, right adnexa, left adnexa and rectum normal.            Pelvic exam was performed with patient supine.   The external female genitalia was normal.     Labial bartholins normal.Cervix is normal. Right adnexum displays no mass and no tenderness. Left adnexum displays no mass and no tenderness. No erythema, vaginal discharge, bleeding, rectocele, cystocele or prolapse of vaginal walls in the vagina. Vagina was moist.   pap smear not completedUerus contour normal  Normal urethral meatus.Urethra findings: no urethral massBladder findings: no bladder distention and no bladder tenderness          Musculoskeletal: Normal range of motion and moves all extremeties.       Neurological: She is alert and oriented to person, place, and time.    Skin: Skin is warm.    Psychiatric: She has a normal mood and affect. Her behavior is normal.           Assessment:     Encounter Diagnoses   Name Primary?    Encounter for gynecological examination (general) (routine) without abnormal findings     Screening for cervical cancer     Screening mammogram, encounter for Yes    Vulvar abscess                      Plan:      Continue annual well woman exam.  Pap 2023 due in 2026; Reviewed updated recommendations for pap smears (every 3 years) in low risk patients.   Recommend annual pelvic exams.  Reviewed recommendations for annual CBE.  Avoid shaving or waxing  Rx sent for septra and bactoban  Continue antibacterial soap  mammo recommended , continue yearly until age 75; [facility]  Continue diet, exercise, weight loss  F/u 2 wk for vulvar check (abscess not in chains--doubtful for HS)  Declines I&d of abscess  Osteoporosis prevention; 1200mg calcium/d with source of vitamin d

## 2025-03-20 DIAGNOSIS — N76.4 VULVAR ABSCESS: ICD-10-CM

## 2025-03-20 RX ORDER — MUPIROCIN 20 MG/G
OINTMENT TOPICAL 3 TIMES DAILY
Qty: 22 G | Refills: 0 | Status: SHIPPED | OUTPATIENT
Start: 2025-03-20 | End: 2025-03-30

## 2025-03-28 ENCOUNTER — OFFICE VISIT (OUTPATIENT)
Dept: OBSTETRICS AND GYNECOLOGY | Facility: CLINIC | Age: 60
End: 2025-03-28
Payer: OTHER GOVERNMENT

## 2025-03-28 VITALS
SYSTOLIC BLOOD PRESSURE: 120 MMHG | DIASTOLIC BLOOD PRESSURE: 84 MMHG | HEIGHT: 66 IN | BODY MASS INDEX: 36.39 KG/M2 | WEIGHT: 226.44 LBS

## 2025-03-28 DIAGNOSIS — N76.4 VULVAR ABSCESS: Primary | ICD-10-CM

## 2025-03-28 PROCEDURE — 99213 OFFICE O/P EST LOW 20 MIN: CPT | Mod: PBBFAC,PN | Performed by: OBSTETRICS & GYNECOLOGY

## 2025-03-28 PROCEDURE — 87070 CULTURE OTHR SPECIMN AEROBIC: CPT | Performed by: OBSTETRICS & GYNECOLOGY

## 2025-03-28 PROCEDURE — 99999 PR PBB SHADOW E&M-EST. PATIENT-LVL III: CPT | Mod: PBBFAC,,, | Performed by: OBSTETRICS & GYNECOLOGY

## 2025-03-28 NOTE — PROCEDURES
"INCISION AND DRAINAGE    Date/Time: 3/28/2025 1:00 PM    Performed by: Jolene Suggs MD  Authorized by: Jolene Suggs MD    Time out: Immediately prior to procedure a "time out" was called to verify the correct patient, procedure, equipment, support staff and site/side marked as required.      Type:  Abscess  Body area:  Anogenital  Location details:  Vulva  Anesthesia:  Local infiltration  Local anesthetic: Lidocaine 1% with epinephrine  Anesthetic total (ml):  5  Scalpel size:  11  Incision type:  Single straight  Incision depth: dermal    Complexity:  Simple  Drainage:  Purulent  Drainage amount:  Scant  Wound treatment:  Incision and wound left open  Patient tolerance:  Patient tolerated the procedure well with no immediate complications  Pain Assessment: 6    "

## 2025-03-28 NOTE — PROGRESS NOTES
Subjective:       Patient ID: Alessia Mak is a 59 y.o. female.    Chief Complaint:  No chief complaint on file.      History of Present Illness  HPI  Here for follow up  Feels vulvar area improving--no new boils    GYN & OB History  Patient's last menstrual period was 2016 (approximate).   Date of Last Pap: 2023    OB History    Para Term  AB Living   1 1 1      SAB IAB Ectopic Multiple Live Births             # Outcome Date GA Lbr Jesus/2nd Weight Sex Type Anes PTL Lv   1 Term      CS-LTranv   FD      Complications: Stillbirth       Review of Systems  Review of Systems   Genitourinary:  Positive for vaginal pain.   All other systems reviewed and are negative.          Objective:      Physical Exam:   Constitutional: She is oriented to person, place, and time. She appears well-developed and well-nourished.     Eyes: Pupils are equal, round, and reactive to light. Conjunctivae and EOM are normal.      Pulmonary/Chest: Effort normal. Right breast exhibits no mass, no nipple discharge, no skin change and no tenderness. Left breast exhibits no mass, no nipple discharge, no skin change and no tenderness. Breasts are symmetrical.        Abdominal: Soft.     Genitourinary:    Inguinal canal and rectum normal.            Pelvic exam was performed with patient supine.   The external female genitalia was normal.     Labial bartholins normal.Right adnexum displays no mass and no tenderness. Left adnexum displays no mass and no tenderness. No erythema, vaginal discharge, bleeding, rectocele, cystocele or prolapse of vaginal walls in the vagina. Urethra findings: no urethral massBladder findings: no bladder distention and no bladder tenderness          Musculoskeletal: Normal range of motion and moves all extremeties.       Neurological: She is alert and oriented to person, place, and time.    Skin: Skin is warm.    Psychiatric: She has a normal mood and affect. Her behavior is normal.            Assessment:        1. Vulvar abscess               Plan:    Continue septra  See I&d note  F/u 2 wks

## 2025-05-16 ENCOUNTER — TELEPHONE (OUTPATIENT)
Dept: OBSTETRICS AND GYNECOLOGY | Facility: CLINIC | Age: 60
End: 2025-05-16
Payer: COMMERCIAL

## 2025-05-16 NOTE — TELEPHONE ENCOUNTER
Pt calling in regards to recent Aerobic Cx  that was done. She is requesting an rx to be sent to pharmacy       Marisa MENDEZ LPN  OB/GYN

## 2025-05-16 NOTE — TELEPHONE ENCOUNTER
----- Message from Elisha sent at 5/16/2025  8:46 AM CDT -----  Regarding: lab rResults / RX FILL  Mrs velez wanted a nurse to call her back about getting a rx fill from  her recent  lab work  she had done , she can be re at [phone]

## 2025-05-17 ENCOUNTER — TELEPHONE (OUTPATIENT)
Dept: OBSTETRICS AND GYNECOLOGY | Facility: CLINIC | Age: 60
End: 2025-05-17

## 2025-05-17 NOTE — TELEPHONE ENCOUNTER
----- Message from Nurse Martin sent at 5/16/2025  8:58 AM CDT -----  Regarding: FW: lab rResults / RX FILL  Good Morning, Pt calling in regards to recent Aerobic Cx  that was done. She is requesting an rx to be sent to pharmacy Please AdviseThank you! Marisa  ----- Message -----  From: Elisha Alicia  Sent: 5/16/2025   8:50 AM CDT  To: Ifeanyi Fernández Staff  Subject: lab rResults / RX FILL                           Mrs velez wanted a nurse to call her back about getting a rx fill from  her recent  lab work  she had done , she can be re at [phone]

## 2025-05-19 NOTE — TELEPHONE ENCOUNTER
Pt stated that pharmacy never contacted her regarding the prescription, pt stated that pharmacy may need a new prescription sent over.

## 2025-05-22 ENCOUNTER — OFFICE VISIT (OUTPATIENT)
Dept: OBSTETRICS AND GYNECOLOGY | Facility: CLINIC | Age: 60
End: 2025-05-22
Payer: COMMERCIAL

## 2025-05-22 VITALS
SYSTOLIC BLOOD PRESSURE: 122 MMHG | WEIGHT: 226.94 LBS | DIASTOLIC BLOOD PRESSURE: 98 MMHG | HEIGHT: 66 IN | BODY MASS INDEX: 36.47 KG/M2

## 2025-05-22 DIAGNOSIS — N76.4 VULVAR ABSCESS: Primary | ICD-10-CM

## 2025-05-22 PROCEDURE — 1159F MED LIST DOCD IN RCRD: CPT | Mod: CPTII,S$GLB,, | Performed by: OBSTETRICS & GYNECOLOGY

## 2025-05-22 PROCEDURE — 3074F SYST BP LT 130 MM HG: CPT | Mod: CPTII,S$GLB,, | Performed by: OBSTETRICS & GYNECOLOGY

## 2025-05-22 PROCEDURE — 3008F BODY MASS INDEX DOCD: CPT | Mod: CPTII,S$GLB,, | Performed by: OBSTETRICS & GYNECOLOGY

## 2025-05-22 PROCEDURE — 3080F DIAST BP >= 90 MM HG: CPT | Mod: CPTII,S$GLB,, | Performed by: OBSTETRICS & GYNECOLOGY

## 2025-05-22 PROCEDURE — 99999 PR PBB SHADOW E&M-EST. PATIENT-LVL III: CPT | Mod: PBBFAC,,, | Performed by: OBSTETRICS & GYNECOLOGY

## 2025-05-22 PROCEDURE — 99214 OFFICE O/P EST MOD 30 MIN: CPT | Mod: S$GLB,,, | Performed by: OBSTETRICS & GYNECOLOGY

## 2025-05-22 RX ORDER — SULFAMETHOXAZOLE AND TRIMETHOPRIM 800; 160 MG/1; MG/1
1 TABLET ORAL 2 TIMES DAILY
Qty: 20 TABLET | Refills: 0 | Status: SHIPPED | OUTPATIENT
Start: 2025-05-22 | End: 2025-06-01

## 2025-05-22 NOTE — PROGRESS NOTES
Subjective:       Patient ID: Alessia Mak is a 59 y.o. female.    Chief Complaint:  Follow-up      History of Present Illness  Follow-up  Here for follow up  [facility]- with hidradenitis and just started on humira    Was finishing up course  of abx when first seen by derm    Overall feels improvement in vulva    Has 2 areas on left buttock /perineum    GYN & OB History  Patient's last menstrual period was 2016 (approximate).   Date of Last Pap: 2023    OB History    Para Term  AB Living   1 1 1      SAB IAB Ectopic Multiple Live Births             # Outcome Date GA Lbr Jesus/2nd Weight Sex Type Anes PTL Lv   1 Term      CS-LTranv   FD      Complications: Stillbirth       Review of Systems  Review of Systems   Genitourinary:  Positive for vaginal pain.   All other systems reviewed and are negative.        Objective:      Physical Exam:   Constitutional: She appears well-developed.     Eyes: Pupils are equal, round, and reactive to light. Conjunctivae and EOM are normal.      Pulmonary/Chest: Effort normal. Right breast exhibits no mass, no nipple discharge, no skin change, no tenderness and presence. Left breast exhibits no mass, no nipple discharge, no skin change, no tenderness and presence. Breasts are symmetrical.        Abdominal: Soft.     Genitourinary:    Inguinal canal and rectum normal.            Pelvic exam was performed with patient supine.   External genitalia lesions present.   Labial bartholins normal.          Musculoskeletal: Normal range of motion.       Neurological: She is alert.    Skin: Skin is warm.    Psychiatric: She has a normal mood and affect.         Assessment:        1. Vulvar abscess               Plan:      Gentle skin cleansing  Continue humira as directed  Rx sent for septra--would advised against repeated use due to abx resistance

## 2025-06-27 ENCOUNTER — OFFICE VISIT (OUTPATIENT)
Dept: OBSTETRICS AND GYNECOLOGY | Facility: CLINIC | Age: 60
End: 2025-06-27
Payer: COMMERCIAL

## 2025-06-27 VITALS
WEIGHT: 225.63 LBS | DIASTOLIC BLOOD PRESSURE: 80 MMHG | HEIGHT: 66 IN | BODY MASS INDEX: 36.26 KG/M2 | SYSTOLIC BLOOD PRESSURE: 110 MMHG

## 2025-06-27 DIAGNOSIS — N76.4 VULVAR ABSCESS: Primary | ICD-10-CM

## 2025-06-27 PROCEDURE — 99999 PR PBB SHADOW E&M-EST. PATIENT-LVL III: CPT | Mod: PBBFAC,,, | Performed by: OBSTETRICS & GYNECOLOGY

## 2025-06-27 RX ORDER — ROSUVASTATIN CALCIUM 20 MG/1
10 TABLET, COATED ORAL
COMMUNITY
Start: 2025-03-14

## 2025-06-27 NOTE — PROGRESS NOTES
Subjective:       Patient ID: Alessia Mak is a 60 y.o. female.    Chief Complaint:  Follow-up      History of Present Illness  Follow-up      Here for follow up  Using topical abx ointment and continues on humira injection  No new abscess, no drainage feels  the area is finally imrpoving  GYN & OB History  Patient's last menstrual period was 2016 (approximate).   Date of Last Pap: 2023    OB History    Para Term  AB Living   1 1 1      SAB IAB Ectopic Multiple Live Births             # Outcome Date GA Lbr Jesus/2nd Weight Sex Type Anes PTL Lv   1 Term      CS-LTranv   FD      Complications: Stillbirth       Review of Systems  Review of Systems   Genitourinary:  Positive for vaginal pain.   All other systems reviewed and are negative.          Objective:      Physical Exam:   Constitutional: She appears well-developed.     Eyes: Pupils are equal, round, and reactive to light. Conjunctivae and EOM are normal.      Pulmonary/Chest: Effort normal. Right breast exhibits no mass, no nipple discharge, no skin change, no tenderness and presence. Left breast exhibits no mass, no nipple discharge, no skin change, no tenderness and presence. Breasts are symmetrical.        Abdominal: Soft.     Genitourinary:    Inguinal canal, vagina and rectum normal.            Pelvic exam was performed with patient supine.   The external female genitalia was normal.     Labial bartholins normal.          Musculoskeletal: Normal range of motion.       Neurological: She is alert.    Skin: Skin is warm.    Psychiatric: She has a normal mood and affect.           Assessment:        1. Vulvar abscess               Plan:      Continue biologic injection  Continue topical abx ointment